# Patient Record
Sex: MALE | Race: OTHER | Employment: UNEMPLOYED | URBAN - METROPOLITAN AREA
[De-identification: names, ages, dates, MRNs, and addresses within clinical notes are randomized per-mention and may not be internally consistent; named-entity substitution may affect disease eponyms.]

---

## 2017-01-03 ENCOUNTER — OFFICE VISIT (OUTPATIENT)
Dept: FAMILY MEDICINE CLINIC | Age: 47
End: 2017-01-03

## 2017-01-03 ENCOUNTER — HOSPITAL ENCOUNTER (OUTPATIENT)
Dept: LAB | Age: 47
Discharge: HOME | End: 2017-01-03

## 2017-01-03 VITALS
TEMPERATURE: 98.3 F | SYSTOLIC BLOOD PRESSURE: 145 MMHG | HEART RATE: 101 BPM | WEIGHT: 154 LBS | DIASTOLIC BLOOD PRESSURE: 91 MMHG | BODY MASS INDEX: 25.66 KG/M2 | HEIGHT: 65 IN

## 2017-01-03 DIAGNOSIS — M25.512 LEFT SHOULDER PAIN, UNSPECIFIED CHRONICITY: ICD-10-CM

## 2017-01-03 DIAGNOSIS — M25.512 LEFT SHOULDER PAIN, UNSPECIFIED CHRONICITY: Primary | ICD-10-CM

## 2017-01-03 PROCEDURE — 83655 ASSAY OF LEAD: CPT | Performed by: FAMILY MEDICINE

## 2017-01-03 NOTE — PROGRESS NOTES
Madeline Ybarra seen at d/c, given AVS and reviewed today's visit with patient. Instructions on how to get walk-in x-ray given to pt, he expressed he had applied for the care card or knew about it. This has been fully explained to the patient, who indicates understanding.

## 2017-01-03 NOTE — PROGRESS NOTES
Coordination of Care  1. Have you been to the ER, urgent care clinic since your last visit?   Hospitalized since your last visit?no

## 2017-01-03 NOTE — PROGRESS NOTES
HISTORY OF PRESENT ILLNESS  Belia Samaniego is a 40 y.o. male. HPI Comments: Long list of c/o, the top of which is 5 months of left shoulder pain, mostly at night, that started after he was working out a lot. Reports no pain meds have helped, stopped working out. Some days aren't bad, other days are. Has noticed swelling of a joint on that side and also of the space above his clavicle. Worried about a lump he has had in the left calf since last spring when it started to hurt at the same time he began to have intermittent difficulty breathing. LBP for which he sees our chiropractor. Cyst left testicle that he thinks is growing. Shoulder Pain      Mass         ROS    Physical Exam   Constitutional: He appears well-developed and well-nourished. No distress. Neck: No thyromegaly present. Cardiovascular: Normal rate, regular rhythm and normal heart sounds. Pulmonary/Chest: Breath sounds normal.   No supraclavicular adenopathy. Genitourinary:   Genitourinary Comments: Tiny left testicular cyst, about 2mm in size. Musculoskeletal: He exhibits no edema. FROM of left shoulder without pain, no impingement. Hard superficial vein left midcalf that I can feel for about an inch distal, not tender. Lymphadenopathy:     He has no cervical adenopathy. ASSESSMENT and PLAN  Left shoulder pain, probably bursitis. He wants xray. Discussed cumulative effects of radiation related to xray, will check shoulder film and consider injection although exam was nl today. Incompetent vein left calf-- IJEOMA hose to the knee.   Probable small testicular cyst, with h/o U/s showing small hydroceles, no need for further eval.

## 2017-01-05 LAB
HISPANIC, LDP2T: NORMAL
LEAD BLD-MCNC: 1 UG/DL (ref 0–19)
RACE, 017371: NORMAL
SPECIMEN SOURCE: NORMAL
TEST PURPOSE, LDP4T: NORMAL

## 2017-01-10 ENCOUNTER — OFFICE VISIT (OUTPATIENT)
Dept: FAMILY MEDICINE CLINIC | Age: 47
End: 2017-01-10

## 2017-01-10 DIAGNOSIS — R52 PAIN: Primary | ICD-10-CM

## 2017-01-11 ENCOUNTER — HOSPITAL ENCOUNTER (OUTPATIENT)
Dept: GENERAL RADIOLOGY | Age: 47
Discharge: HOME | End: 2017-01-11
Payer: SUBSIDIZED

## 2017-01-11 DIAGNOSIS — M25.512 LEFT SHOULDER PAIN, UNSPECIFIED CHRONICITY: ICD-10-CM

## 2017-01-11 PROCEDURE — 71020 XR CHEST PA LAT: CPT

## 2017-05-24 ENCOUNTER — APPOINTMENT (OUTPATIENT)
Dept: GENERAL RADIOLOGY | Age: 47
End: 2017-05-24
Attending: EMERGENCY MEDICINE
Payer: SELF-PAY

## 2017-05-24 ENCOUNTER — HOSPITAL ENCOUNTER (EMERGENCY)
Age: 47
Discharge: HOME | End: 2017-05-24
Attending: EMERGENCY MEDICINE
Payer: SELF-PAY

## 2017-05-24 VITALS
HEIGHT: 67 IN | DIASTOLIC BLOOD PRESSURE: 81 MMHG | TEMPERATURE: 98.2 F | RESPIRATION RATE: 16 BRPM | OXYGEN SATURATION: 97 % | SYSTOLIC BLOOD PRESSURE: 140 MMHG | HEART RATE: 75 BPM | WEIGHT: 150.13 LBS | BODY MASS INDEX: 23.56 KG/M2

## 2017-05-24 DIAGNOSIS — M54.2 NECK PAIN: Primary | ICD-10-CM

## 2017-05-24 LAB
ANION GAP BLD CALC-SCNC: 6 MMOL/L (ref 5–15)
BASOPHILS # BLD AUTO: 0 K/UL (ref 0–0.1)
BASOPHILS # BLD: 0 % (ref 0–1)
BUN SERPL-MCNC: 7 MG/DL (ref 6–20)
BUN/CREAT SERPL: 8 (ref 12–20)
CALCIUM SERPL-MCNC: 8.5 MG/DL (ref 8.5–10.1)
CHLORIDE SERPL-SCNC: 103 MMOL/L (ref 97–108)
CO2 SERPL-SCNC: 31 MMOL/L (ref 21–32)
CREAT SERPL-MCNC: 0.85 MG/DL (ref 0.7–1.3)
EOSINOPHIL # BLD: 0.2 K/UL (ref 0–0.4)
EOSINOPHIL NFR BLD: 3 % (ref 0–7)
ERYTHROCYTE [DISTWIDTH] IN BLOOD BY AUTOMATED COUNT: 13.6 % (ref 11.5–14.5)
GLUCOSE SERPL-MCNC: 109 MG/DL (ref 65–100)
HCT VFR BLD AUTO: 44.2 % (ref 36.6–50.3)
HGB BLD-MCNC: 15.4 G/DL (ref 12.1–17)
LYMPHOCYTES # BLD AUTO: 22 % (ref 12–49)
LYMPHOCYTES # BLD: 1.5 K/UL (ref 0.8–3.5)
MCH RBC QN AUTO: 29.5 PG (ref 26–34)
MCHC RBC AUTO-ENTMCNC: 34.8 G/DL (ref 30–36.5)
MCV RBC AUTO: 84.7 FL (ref 80–99)
MONOCYTES # BLD: 0.4 K/UL (ref 0–1)
MONOCYTES NFR BLD AUTO: 6 % (ref 5–13)
NEUTS SEG # BLD: 4.6 K/UL (ref 1.8–8)
NEUTS SEG NFR BLD AUTO: 69 % (ref 32–75)
PLATELET # BLD AUTO: 274 K/UL (ref 150–400)
POTASSIUM SERPL-SCNC: 4.3 MMOL/L (ref 3.5–5.1)
RBC # BLD AUTO: 5.22 M/UL (ref 4.1–5.7)
SODIUM SERPL-SCNC: 140 MMOL/L (ref 136–145)
TROPONIN I SERPL-MCNC: <0.04 NG/ML
WBC # BLD AUTO: 6.7 K/UL (ref 4.1–11.1)

## 2017-05-24 PROCEDURE — 99283 EMERGENCY DEPT VISIT LOW MDM: CPT

## 2017-05-24 PROCEDURE — 70360 X-RAY EXAM OF NECK: CPT

## 2017-05-24 PROCEDURE — 85025 COMPLETE CBC W/AUTO DIFF WBC: CPT | Performed by: EMERGENCY MEDICINE

## 2017-05-24 PROCEDURE — 71020 XR CHEST PA LAT: CPT

## 2017-05-24 PROCEDURE — 93005 ELECTROCARDIOGRAM TRACING: CPT

## 2017-05-24 PROCEDURE — 80048 BASIC METABOLIC PNL TOTAL CA: CPT | Performed by: EMERGENCY MEDICINE

## 2017-05-24 PROCEDURE — 36415 COLL VENOUS BLD VENIPUNCTURE: CPT | Performed by: EMERGENCY MEDICINE

## 2017-05-24 PROCEDURE — 84484 ASSAY OF TROPONIN QUANT: CPT | Performed by: EMERGENCY MEDICINE

## 2017-05-24 RX ORDER — TRAMADOL HYDROCHLORIDE AND ACETAMINOPHEN 37.5; 325 MG/1; MG/1
1 TABLET ORAL
Qty: 20 TAB | Refills: 0 | Status: SHIPPED | OUTPATIENT
Start: 2017-05-24 | End: 2018-01-02

## 2017-05-24 NOTE — ED NOTES
Patient given discharge instructions by provider using language line. Patient verbalized understanding and denies questions.

## 2017-05-24 NOTE — ED PROVIDER NOTES
HPI Comments: 56 y.o. male with past medical history significant for CTS who presents to the ED with chief complaint of neck pain. Pt reports neck pain that \"feels like fullness\" onset about 5 days ago that is causing him to have difficulty breathing and swallowing. Pt states he has hx of similar sx once in the past about a year ago, says he was seen at [address] [place] [address] at that time, had a negative workup, and his sx resolved spontaneously. Pt's O2 sats are 100% on RA. Pt denies cough or fever. There are no other acute medical complaints voiced at this time. PCP: Joe Sawyer NP    Note written by Madison Ratliff, as dictated by Stefanie Reddy MD 1:31 PM     The history is provided by the patient. The history is limited by a language barrier (Dominican). A  was used ([place]). Past Medical History:   Diagnosis Date    Carpal tunnel syndrome        Past Surgical History:   Procedure Laterality Date    HX CARPAL TUNNEL RELEASE  2016    HX CYST REMOVAL Left 2016         Family History:   Problem Relation Age of Onset    Diabetes Neg Hx     Hypertension Neg Hx        Social History     Social History    Marital status: SINGLE     Spouse name: N/A    Number of children: N/A    Years of education: N/A     Occupational History    Not on file. Social History Main Topics    Smoking status: Former Smoker     Types: Cigarettes    Smokeless tobacco: Former User      Comment: 2011 quit    Alcohol use 0.0 oz/week     0 Standard drinks or equivalent per week      Comment: occ.  Drug use: No    Sexual activity: Yes     Partners: Female     Other Topics Concern    Not on file     Social History Narrative    ** Merged History Encounter **              ALLERGIES: Review of patient's allergies indicates no known allergies. Review of Systems   Constitutional: Negative. Negative for appetite change, fever and unexpected weight change. HENT: Positive for trouble swallowing. Negative for ear pain, hearing loss, nosebleeds, rhinorrhea and sore throat. Respiratory: Positive for shortness of breath. Negative for cough and chest tightness. Cardiovascular: Negative. Negative for chest pain and palpitations. Gastrointestinal: Negative. Negative for abdominal distention, abdominal pain, blood in stool and vomiting. Endocrine: Negative. Genitourinary: Negative for dysuria and hematuria. Musculoskeletal: Positive for neck pain. Negative for back pain and myalgias. Skin: Negative. Negative for rash. Allergic/Immunologic: Negative. Neurological: Negative. Negative for dizziness, syncope, weakness and numbness. Hematological: Negative. Psychiatric/Behavioral: Negative. All other systems reviewed and are negative. Vitals:    05/24/17 1307   BP: 138/87   Pulse: 90   Resp: 18   Temp: 98.2 °F (36.8 °C)   SpO2: 100%   Weight: 68.1 kg (150 lb 2.1 oz)   Height: 5' 7\" (1.702 m)            Physical Exam   Constitutional: He is oriented to person, place, and time. He appears well-developed and well-nourished. No distress. HENT:   Head: Normocephalic and atraumatic. Right Ear: External ear normal.   Left Ear: External ear normal.   Nose: Nose normal.   Mouth/Throat: Oropharynx is clear and moist.   Eyes: Conjunctivae and EOM are normal. Pupils are equal, round, and reactive to light. Neck: Normal range of motion. Neck supple. No JVD present. No thyromegaly present. Cardiovascular: Normal rate, regular rhythm, normal heart sounds and intact distal pulses. No murmur heard. Pulmonary/Chest: Effort normal and breath sounds normal. No respiratory distress. He has no wheezes. He has no rales. Abdominal: Soft. Bowel sounds are normal. He exhibits no distension. There is no tenderness. Musculoskeletal: Normal range of motion. He exhibits no edema. Neurological: He is alert and oriented to person, place, and time. No cranial nerve deficit.    Skin: Skin is warm and dry. No rash noted. Psychiatric: He has a normal mood and affect. His behavior is normal. Thought content normal.   Nursing note and vitals reviewed. Note written by Madison Rodarte, as dictated by John Baugh MD 1:32 PM    [facility]  ED Course       Procedures     ED EKG interpretation:  Rhythm: normal sinus rhythm; and regular . Rate (approx.): 90; Axis: normal; ST/T wave: normal; no ectopy. Note written by Madison Rodarte, as dictated by John Baugh MD 1:18 PM    PROGRESS NOTE:  2:37 PM   BMP and CBC normal. Troponin negative. X-ray of neck showed no soft tissue swelling. Chest x-ray normal.     A/P: Nonspecific neck pain. Will treat with short term analgesics and f/u with PCP as needed.

## 2017-05-24 NOTE — DISCHARGE INSTRUCTIONS
We hope that we have addressed all of your medical concerns. The examination and treatment you received in the Emergency Department were for an emergent problem and were not intended as complete care. It is important that you follow up with your healthcare provider(s) for ongoing care. If your symptoms worsen or do not improve as expected, and you are unable to reach your usual health care provider(s), you should return to the Emergency Department. Today's healthcare is undergoing tremendous change, and patient satisfaction surveys are one of the many tools to assess the quality of medical care. You may receive a survey from the [employer] regarding your experience in the Emergency Department. I hope that your experience has been completely positive, particularly the medical care that I provided. As such, please participate in the survey; anything less than excellent does not meet my expectations or intentions. [facility] and [employer] participate in nationally recognized quality of care measures. If your blood pressure is greater than 120/80, as reported below, we urge that you seek medical care to address the potential of high blood pressure, commonly known as hypertension. Hypertension can be hereditary or can be caused by certain medical conditions, pain, stress, or \"white coat syndrome. \"       Please make an appointment with your health care provider(s) for follow up of your Emergency Department visit. VITALS:   Patient Vitals for the past 8 hrs:   Temp Pulse Resp BP SpO2   05/24/17 1307 98.2 °F (36.8 °C) 90 18 138/87 100 %          Thank you for allowing us to provide you with medical care today. We realize that you have many choices for your emergency care needs. Please choose us in the future for any continued health care needs. Regards,           Cesar Higgins, [address] [place] [address] [address]   Office: [phone]            Recent Results (from the past 24 hour(s))   METABOLIC PANEL, BASIC    Collection Time: 05/24/17  1:51 PM   Result Value Ref Range    Sodium 140 136 - 145 mmol/L    Potassium 4.3 3.5 - 5.1 mmol/L    Chloride 103 97 - 108 mmol/L    CO2 31 21 - 32 mmol/L    Anion gap 6 5 - 15 mmol/L    Glucose 109 (H) 65 - 100 mg/dL    BUN 7 6 - 20 MG/DL    Creatinine 0.85 0.70 - 1.30 MG/DL    BUN/Creatinine ratio 8 (L) 12 - 20      GFR est AA >60 >60 ml/min/1.73m2    GFR est non-AA >60 >60 ml/min/1.73m2    Calcium 8.5 8.5 - 10.1 MG/DL   CBC WITH AUTOMATED DIFF    Collection Time: 05/24/17  1:51 PM   Result Value Ref Range    WBC 6.7 4.1 - 11.1 K/uL    RBC 5.22 4.10 - 5.70 M/uL    HGB 15.4 12.1 - 17.0 g/dL    HCT 44.2 36.6 - 50.3 %    MCV 84.7 80.0 - 99.0 FL    MCH 29.5 26.0 - 34.0 PG    MCHC 34.8 30.0 - 36.5 g/dL    RDW 13.6 11.5 - 14.5 %    PLATELET 461 184 - 949 K/uL    NEUTROPHILS 69 32 - 75 %    LYMPHOCYTES 22 12 - 49 %    MONOCYTES 6 5 - 13 %    EOSINOPHILS 3 0 - 7 %    BASOPHILS 0 0 - 1 %    ABS. NEUTROPHILS 4.6 1.8 - 8.0 K/UL    ABS. LYMPHOCYTES 1.5 0.8 - 3.5 K/UL    ABS. MONOCYTES 0.4 0.0 - 1.0 K/UL    ABS. EOSINOPHILS 0.2 0.0 - 0.4 K/UL    ABS. BASOPHILS 0.0 0.0 - 0.1 K/UL   TROPONIN I    Collection Time: 05/24/17  1:51 PM   Result Value Ref Range    Troponin-I, Qt. <0.04 <0.05 ng/mL       Xr Neck Soft Tissue    Result Date: 5/24/2017  Clinical history: Neck pain INDICATION: Neck swelling FINDINGS: Lateral and AP views of the cervical soft tissues were obtained. There is no fracture or dislocation. Visualized osseous structures are within normal limits. Postprocedural soft tissue within normal limits. No radiopaque foreign body. IMPRESSION: No acute findings     Xr Chest Pa Lat    Result Date: 5/24/2017  CLINICAL HISTORY: Dyspnea INDICATION: Dyspnea COMPARISON: 1117 FINDINGS: PA and lateral views of the chest are obtained. The cardiopericardial silhouette is within normal limits.  There is no pleural effusion, pneumothorax or focal consolidation present. IMPRESSION: No acute intrathoracic disease.

## 2017-05-25 ENCOUNTER — PATIENT OUTREACH (OUTPATIENT)
Dept: FAMILY MEDICINE CLINIC | Age: 47
End: 2017-05-25

## 2017-05-25 LAB
ATRIAL RATE: 90 BPM
CALCULATED P AXIS, ECG09: 60 DEGREES
CALCULATED R AXIS, ECG10: 56 DEGREES
CALCULATED T AXIS, ECG11: 62 DEGREES
DIAGNOSIS, 93000: NORMAL
P-R INTERVAL, ECG05: 130 MS
Q-T INTERVAL, ECG07: 342 MS
QRS DURATION, ECG06: 74 MS
QTC CALCULATION (BEZET), ECG08: 418 MS
VENTRICULAR RATE, ECG03: 90 BPM

## 2018-01-02 ENCOUNTER — OFFICE VISIT (OUTPATIENT)
Dept: FAMILY MEDICINE CLINIC | Age: 48
End: 2018-01-02

## 2018-01-02 VITALS
TEMPERATURE: 98.8 F | HEART RATE: 81 BPM | WEIGHT: 157 LBS | DIASTOLIC BLOOD PRESSURE: 85 MMHG | SYSTOLIC BLOOD PRESSURE: 131 MMHG | BODY MASS INDEX: 24.59 KG/M2

## 2018-01-02 DIAGNOSIS — F41.9 ANXIETY: ICD-10-CM

## 2018-01-02 DIAGNOSIS — E73.9 LACTOSE INTOLERANCE IN ADULT: ICD-10-CM

## 2018-01-02 DIAGNOSIS — K58.2 IRRITABLE BOWEL SYNDROME WITH BOTH CONSTIPATION AND DIARRHEA: Primary | ICD-10-CM

## 2018-01-02 RX ORDER — DICYCLOMINE HYDROCHLORIDE 10 MG/1
20 CAPSULE ORAL 3 TIMES DAILY
Qty: 90 CAP | Refills: 1 | Status: SHIPPED | OUTPATIENT
Start: 2018-01-02

## 2018-01-02 RX ORDER — DICYCLOMINE HYDROCHLORIDE 20 MG/1
20 TABLET ORAL EVERY 6 HOURS
Qty: 30 TAB | Refills: 1 | Status: SHIPPED | OUTPATIENT
Start: 2018-01-02 | End: 2018-01-02

## 2018-01-02 NOTE — PROGRESS NOTES
Assessment/Plan:       ICD-10-CM ICD-9-CM    1. Irritable bowel syndrome with both constipation and diarrhea K58.2 564.1 dicyclomine (BENTYL) 10 mg capsule      DISCONTINUED: dicyclomine (BENTYL) 20 mg tablet   2. Lactose intolerance in adult E73.9 271.3    3. Anxiety F41.9 300.00        [address], [address] [place] [address] Tod Herrera  [address] [facility] [address]  Phone: [phone] Fax: [phone]      [facility]-  [address]  Subjective:   No chief complaint on file. Snow Ybarra is a 56 y.o. OTHER male. HPI: was at [facility]note 3[facility] for neck swelling and SOB about 6 months ago. He  has a past medical history of Carpal tunnel syndrome. He  does not have a problem list on file. Review of Systems: Negative for: fever, chest pain, shortness of breath, leg swelling, exertional dyspnea, palpitations. Current Medications:   No current outpatient prescriptions on file prior to visit. No current facility-administered medications on file prior to visit. Past Surgical History: He  has a past surgical history that includes hx carpal tunnel release (2016) and hx cyst removal (Left, 2016). Social and Family History: He  reports that he has quit smoking. His smoking use included Cigarettes. He has quit using smokeless tobacco. He reports that he does not drink alcohol or use illicit drugs. ; family history is negative for Diabetes and Hypertension. Objective:     Vitals:    01/02/18 0914   BP: 131/85   Pulse: 81   Temp: 98.8 °F (37.1 °C)   TempSrc: Oral   Weight: 157 lb (71.2 kg)    No LMP for male patient. Wt Readings from Last 2 Encounters:   01/02/18 157 lb (71.2 kg)   05/24/17 150 lb 2.1 oz (68.1 kg)     No results found for any visits on 01/02/18. Physical Examination:  General appearance - well developed, no acute distress. Chest - clear to auscultation. Heart - regular rate and rhythm without murmurs, rubs, or gallops.   Abdomen - bowel sounds present x 4, NT, ND. Extremities - pulses intact. No peripheral edema. Assessment/Plan:   Diagnoses and all orders for this visit:    1. Irritable bowel syndrome with both constipation and diarrhea  -     dicyclomine (BENTYL) 10 mg capsule; Take 2 Caps by mouth three (3) times daily. Upper sorbian sig    2. Lactose intolerance in adult    3. Anxiety    Follow-up Disposition:  Return for to see Tanya Cheema for anxiety. Adwoa Rodriguez, LEXX, FNP-BC, BC-ADM  Karla Ybarra expressed understanding of this plan.

## 2018-01-02 NOTE — PROGRESS NOTES
Coordination of Care  1. Have you been to the ER, urgent care clinic since your last visit? Hospitalized since your last visit? Yes When: 6 months ago Where: [facility] Reason for visit: neck swelling and s. o.b    2. Have you seen or consulted any other health care providers outside of the [facility] since your last visit? Include any pap smears or colon screening. No    Medications  Does the patient need refills? NO    Learning Assessment Complete?  yes

## 2018-01-02 NOTE — Clinical Note
Has been to ER multiple times. We discussed how stress manifests as pain in the body. He is accepting of his previous diagnosis of anxiety. I discussed IBS as being along the lines of an anxious stomach; he is also lactose intolerant. I believe he will be receptive to relaxation techniques and other methods of reducing the physical stress in his body. I hope that I have reassurred him that there is nothing life threatening going on with his lungs or stomach and that at this time he does not have a weak immune system (he was told he did 18 months ago).

## 2018-01-02 NOTE — PATIENT INSTRUCTIONS
Intolerancia a la lactosa: Instrucciones de cuidado - [ Lactose Intolerance: Care Instructions ]  Instrucciones de cuidado    La lactosa es el azúcar que se encuentra en la Humptulips y los productos lácteos. Algunas personas no producen la cantidad suficiente de ryan enzima llamada lactasa, que digiere la Ulrichen. Cuando esto ocurre puede causar gases, dolor abdominal, diarrea y abotagamiento. Esta afección recibe el nombre de intolerancia a la lactosa. [place] no es lo mismo que ryan alergia alimentaria a la Humptulips. La intolerancia a la lactosa afecta a cada persona de manera distinta. Algunas personas no pueden digerir ningún producto lácteo. Otras personas pueden comer o beber pequeñas cantidades o determinados tipos de productos lácteos sin problemas. Usted puede aprender a evitar la incomodidad sin dejar de recibir suficiente calcio para mantener los [employer]. La atención de seguimiento es ryan parte clave de mcguire tratamiento y seguridad. Asegúrese de hacer y acudir a todas las citas, y llame a mcguire médico si está teniendo problemas. También es ryan buena idea saber los resultados de los exámenes y mantener ryan lista de los medicamentos que minerva. ¿Cómo puede cuidarse en el hogar? · Limite la cantidad de Humptulips y productos lácteos en mcguire Erminia December. Trate de beber 1 vaso de [employer]ocker. Dodie cantidades pequeñas varias veces al día. Todos los tipos de Humptulips tienen la misma cantidad de Ulrichen. En nima de no estar seguro de si un producto lácteo le causa síntomas, pruebe con ryan pequeña cantidad y espere a javid cómo se siente antes de comer o beber ryan cantidad mayor. · Jeaneen Janiya o coma productos lácteos con otros alimentos. Para algunas personas, combinar un alimento sólido (jayne cereales) con un lácteo (jayne la Humptulips) puede reducir los síntomas. · Consuma pequeñas cantidades de productos lácteos troy el día en lugar de grandes cantidades de ryan mariana vez.   · Jeaneen Janiya o coma productos lácteos con bajo contenido de Ulrichen. Puede comprar estos productos, jayne la [employer], en la mayoría de los supermercados. · Dodie o coma otros alimentos en lugar de Bejou y productos lácteos. Pruebe la Bejou y el queso de soya, y utilice cremas no lácteas para el café. Tenga en cuenta que las cremas no lácteas pueden contener más grasa que la Bejou. · Use productos de lactasa. Se trata de suplementos dietéticos que le ayudan a digerir la lactosa. Algunos son pastillas para masticar (jayne Lactaid) antes de comer o beber productos lácteos. Otros son líquidos que se añaden a la leche 24 horas antes de beberla. Pruebe algunos productos y [place] el que sea más eficaz para usted. · Algunas personas que tienen intolerancia a la lactosa pueden comer algunos tipos de yogur sin problemas, sobre todo si tiene cultivos vivos. Es mejor probar ryan pequeña cantidad de [employer]ot de yogur para javid cuáles le funcionan mejor. · Preste atención al contenido de lactosa en los alimentos que compra. Algunos alimentos preparados contienen lactosa, jayne los panes y los productos de panadería, los cereales para el desayuno, las doug y sopas instantáneas, la [place], los aderezos para ensalada y muchos refrigerios. Asegúrese de leer las etiquetas para determinar si hay lactosa o nombres \"ocultos\" de [place]. [employer] se encuentran los extractos secos de la Bejou, el kristen, la La junta, los subproductos de la Bejou y la leche descremada en polvo. · Asegúrese de obtener suficiente calcio en la dieta, sobre todo si suprime por completo los productos lácteos. Para obtener la cantidad suficiente, necesita comer alimentos ricos en calcio con la misma frecuencia con la que ryan persona [place]. El calcio es muy importante porque mantiene los huesos nathanael y reduce el riesgo de osteoporosis. [place] consejo a mcguire dietista sobre cómo obtener suficiente calcio.  Entre los alimentos que contienen calcio se encuentran:  ¨ El brócoli, la espinaca, la col rizada, la berza, y las hojas de [place] y de [facility]. ¨ Las frantz enlatadas y otros pescados pequeños que pueda comer con [place]. ¨ Jugo de naranja enriquecido con calcio. ¨ Los productos de soya, jayne la [place] de soya y el tofu enriquecidos. ¨ Almendras. ¨ Frijoles (habichuelas) secos. · Si está preocupado por no consumir suficientes nutrientes, pregúntele a mcguire médico si debe gosia suplementos, jayne de calcio y vitamina D.  ¿Cuándo debe pedir ayuda? Llame a mcguire médico ahora mismo o busque atención médica inmediata si:  ? · Tiene dolor abdominal nuevo o peor. ?Preste especial atención a los cambios en mcguire thalia y asegúrese de comunicarse con mcguire médico si:  ? · No mejora jayne se esperaba. ¿Dónde puede encontrar más información en inglés? Afsaneh Mesa a http://sybil-dionicio.info/. Escriba H201 en la búsqueda para aprender más acerca de \"Intolerancia a la lactosa: Instrucciones de cuidado - [ Lactose Intolerance: Care Instructions ]. \"  Revisado: 29 septiembre, 2016  Versión del contenido: 11.4  © 0694-6240 Healthwise, Incorporated. Las instrucciones de cuidado fueron adaptadas bajo licencia por [facility] (which disclaims liability or warranty for this information). Si usted tiene [place] afección médica o sobre estas instrucciones, siempre pregunte a mcguire profesional de thalia. Healthwise, Incorporated niega toda garantía o responsabilidad por mcguire uso de esta información. Dieta restringida en lactosa: Instrucciones de cuidado - [ Lactose-Restricted Diet: Care Instructions ]  Instrucciones de cuidado    La lactosa es el azúcar presente en la [place] y los productos lácteos. Algunas personas no producen la cantidad suficiente de ryan enzima llamada lactasa, que digiere la Ulrichen. Cuando esto ocurre puede causar gases, dolor abdominal, diarrea y abotagamiento (sensación de estar inflado).  Esta afección recibe el nombre de intolerancia a la lactosa. [place] no es lo mismo que ryan alergia alimentaria a la AT&T. Si lo planea, puede evitar la lactosa y continuar comiendo ryan [place] y nutritiva, que también le permita obtener el calcio necesario para mantener los huesos saludables. Mcguire médico y Maryse[employer] ayudarán a crear ryan dieta basada en mcguire nivel de intolerancia a la lactosa y lo que le gusta comer. Siempre consulte a mcguire médico o dietista antes de hacer modificaciones en mcguire dieta. La atención de seguimiento es ryan parte clave de mcguire tratamiento y seguridad. Asegúrese de hacer y acudir a todas las citas, y llame a mcguire médico si está teniendo problemas. También es ryan buena idea saber los resultados de los exámenes y mantener ryan lista de los medicamentos que minerva. ¿Cómo puede cuidarse en el hogar? · Limite la cantidad de AT&T y productos lácteos en mcguire Erminia December. [address] productos lácteos en pequeñas cantidades a lo lorin del día, en lugar de consumir grandes cantidades a la vez. ¨ Si tiene síntomas graves cuando come o lesly algo con lactosa, es posible que necesite evitarla por completo. ¨ Quizá pueda beber 1 vaso de leche al día, aunque es posible que no pueda beber más de ½ taza a la vez. Todos los tipos de AT&T tienen la misma cantidad de Ulrichen. ¨ En nima de no estar seguro de si un producto lácteo le causa síntomas, pruebe con ryan pequeña cantidad y espere a javid cómo se siente antes de comer o beber ryan cantidad mayor. · Intente comer yogur y Jewell-barre. Tienen menos [address] y es posible que no le causen [place]. · [employer] Corporation o coma productos lácteos con bajo contenido de Ulrichen. Puede comprar estos productos, jayne la [employer], en la mayoría de los supermercados. · Use productos de lactasa. Estos son suplementos dietéticos que le ayudan a digerir la lactosa. Algunos vienen en pastillas para masticar (jayne Lactaid) antes de comer o beber productos lácteos.  Otros son líquidos que se le añaden a [place] 24 horas antes de tomarla. Pruebe algunos productos y [place] el que sea más eficaz para usted. · Dodie o coma otros alimentos, jayne leche de soya y queso de soya, en lugar de [place] y productos lácteos. · Si es muy sensible a la lactosa, alissa las etiquetas detenidamente para identificar los productos que contengan Ulrichen. ¨ Algunos medicamentos contienen lactosa. ¨ Los alimentos preparados que pueden contener lactosa incluyen el pan, los productos horneados, los cereales para el desayuno, las bebidas instantáneas para el desayuno, las doug y sopas instantáneas, los preparados para hornear (jayne panqueques, galletas y bizcochos), la [place], los aderezos para [place], los [place], el chocolate con [place] y otros refrigerios. ¨ Otros nombres alternativos que se usan para la lactosa son kristen de Aliyah Ax productos lácteos. · Asegúrese de obtener suficiente calcio en la dieta, sobre todo si suprime por completo los productos lácteos. Para obtener la cantidad suficiente, necesita comer alimentos ricos en calcio con la misma frecuencia con la que ryan persona Eulas Burdock. El calcio es muy importante porque mantiene los huesos nathanael y reduce el riesgo de osteoporosis. [place] consejo a mcguire dietista sobre cómo obtener suficiente calcio. Entre los alimentos que contienen calcio se encuentran:  ¨ El brócoli, la espinaca, la col rizada, la [place], y las hojas de [place] y de nabo. ¨ Las frantz enlatadas y otros pescados pequeños que pueda comer con [place]. ¨ El jugo de naranja enriquecido con calcio. ¨ Los productos de soya, jayne la [place] de soya y el tofu enriquecidos. ¨ Almendras. ¨ Frijoles (habichuelas) secos. · Si está preocupado por no consumir suficientes nutrientes, pregúntele a mcguire médico si debe gosia suplementos, jayne de calcio y vitamina D.  ¿Cuándo debe pedir ayuda? Llame a mcguire médico ahora mismo o busque atención médica inmediata si:  ? · Tiene dolor abdominal nuevo o peor. ?Preste especial atención a los cambios en mcguire thalia y asegúrese de comunicarse con mcguire médico si:  ? · No mejora jayne se esperaba. ¿Dónde puede encontrar más información en inglés? Bro Martinez a http://sybil-dionicio.info/. Mohini Nelson F952 en la búsqueda para aprender más acerca de \"Dieta restringida en lactosa: Instrucciones de cuidado - [ Lactose-Restricted Diet: Care Instructions ]. \"  Revisado: 29 septiembre, 2016  Versión del contenido: 11.4  © 2736-8067 Healthwise, Incorporated. Las instrucciones de cuidado fueron adaptadas bajo licencia por [facility] [employer] (which disclaims liability or warranty for this information). Si usted tiene [place] afección médica o sobre estas instrucciones, siempre pregunte a mcguire profesional de thalia. Healthwise, Incorporated niega toda garantía o responsabilidad por mcguire uso de esta información.

## 2018-01-02 NOTE — PROGRESS NOTES
Assessment/Plan:       ICD-10-CM ICD-9-CM    1. Irritable bowel syndrome with both constipation and diarrhea K58.2 564.1 dicyclomine (BENTYL) 10 mg capsule      DISCONTINUED: dicyclomine (BENTYL) 20 mg tablet   2. Lactose intolerance in adult E73.9 271.3    3. Anxiety F41.9 300.00        [facility] [address] [place] [address] Tod Herrera  [address] [facility] [id number]  Phone: [phone] Fax: [phone]      [facility]-  [address]  Subjective:   No chief complaint on file. Yuli Ybarra is a 56 y.o. OTHER male. HPI: was at [address] [place] [address] for neck swelling and SOB about 6 months ago. Pain LLQ for 15 days - this is new and affects his breathing. He has noticed a change in his bowels. Feels like the lungs are weak. He is sleeping well. No nausea. No blood in the stool. No changes in the urine. For 2 years he has had constipation. He has been diagnosed with anxiety, given pills but when they finished he stopped taking them. He  has a past medical history of Carpal tunnel syndrome. He  does not have a problem list on file. Review of Systems: Negative for: fever, chest pain, shortness of breath, leg swelling, exertional dyspnea, palpitations. Current Medications:   No current outpatient prescriptions on file prior to visit. No current facility-administered medications on file prior to visit. Past Surgical History: He  has a past surgical history that includes hx carpal tunnel release (2016) and hx cyst removal (Left, 2016). Social and Family History: He  reports that he has quit smoking. His smoking use included Cigarettes. He has quit using smokeless tobacco. He reports that he does not drink alcohol or use illicit drugs. ; family history is negative for Diabetes and Hypertension. Objective:     Vitals:    01/02/18 0914   BP: 131/85   Pulse: 81   Temp: 98.8 °F (37.1 °C)   TempSrc: Oral   Weight: 157 lb (71.2 kg)    No LMP for male patient. Wt Readings from Last 2 Encounters:   01/02/18 157 lb (71.2 kg)   05/24/17 150 lb 2.1 oz (68.1 kg)     No results found for any visits on 01/02/18. Physical Examination:  General appearance - well developed, no acute distress. Chest - clear to auscultation. Heart - regular rate and rhythm without murmurs, rubs, or gallops. Abdomen - bowel sounds present x 4, mild tenderness LLQ, ND  Extremities - pulses intact. No peripheral edema. Assessment/Plan:   Diagnoses and all orders for this visit:    1. Irritable bowel syndrome with both constipation and diarrhea  -     dicyclomine (BENTYL) 10 mg capsule; Take 2 Caps by mouth three (3) times daily. Korean sig    2. Lactose intolerance in adult    3. Anxiety      Follow-up Disposition:  Return for to see Salome Adjutant for anxiety. He has panic attacks. To see Salome Adjutant. Taking \"Fortiflex\" for 10 days. (this is glucosamine chondroitin and green tea extract; the green tea might possibly contribute to increased anxiety due to caffeine). Pravin Neri, LEXX, FNP-BC, BC-ADM  Misael Ybarra expressed understanding of this plan.

## 2018-02-06 ENCOUNTER — OFFICE VISIT (OUTPATIENT)
Dept: FAMILY MEDICINE CLINIC | Age: 48
End: 2018-02-06

## 2018-02-06 DIAGNOSIS — F41.1 GENERALIZED ANXIETY DISORDER: Primary | ICD-10-CM

## 2018-02-06 NOTE — PROGRESS NOTES
INITIAL SESSION    Referred by Mary Ferrer for anxiety related physical symptoms. Reports that anxiety started about 2 years ago when he had a breathing event at [employer]. It turned out to be low potassium, but he feels he has been anxious since then. No anxiety or depression in his family. Lives with girlfriend and her 2 children who are in college, so not there a lot. Denies any high stress at home, other than typical financial concerns. Has friends, but often does not feel like going out with them. Only works 4 days/week because 'that's all I can handle.' Works as a  and is frequently worrying on his way into work that he will not be able to handle the work for that day. Has never had this happen, though. 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 Has fears of serious medical issues with himself, but does not worry about this in others in his family. Sleeps and eats ok. Is very aware of what he eats as he struggles with problems with colon. Farooq also reported low self esteem (related to not being able to work) and being too sensitive to what others might say to him or around him; tends to take things personally that are not directed to him. Farooq uses chamomile tea, breathing, self talk, distractions, and talking with family in [place], to calm himself when he becomes anxious. He does not take medication bc he does not want to become too reliant on them. We discussed progressive relaxation and I gave him handouts on anger, anxiety, progressive relaxation, and breathing. He demonstrated interest and we will follow up on any practices he has been doing next session.

## 2018-03-13 ENCOUNTER — OFFICE VISIT (OUTPATIENT)
Dept: FAMILY MEDICINE CLINIC | Age: 48
End: 2018-03-13

## 2018-03-13 DIAGNOSIS — F41.1 GENERALIZED ANXIETY DISORDER: Primary | ICD-10-CM

## 2018-06-24 ENCOUNTER — APPOINTMENT (OUTPATIENT)
Dept: GENERAL RADIOLOGY | Age: 48
End: 2018-06-24
Attending: NURSE PRACTITIONER
Payer: SELF-PAY

## 2018-06-24 ENCOUNTER — HOSPITAL ENCOUNTER (EMERGENCY)
Age: 48
Discharge: HOME | End: 2018-06-24
Attending: EMERGENCY MEDICINE
Payer: SELF-PAY

## 2018-06-24 VITALS
RESPIRATION RATE: 12 BRPM | SYSTOLIC BLOOD PRESSURE: 110 MMHG | HEART RATE: 98 BPM | WEIGHT: 165 LBS | HEIGHT: 65 IN | TEMPERATURE: 98.5 F | OXYGEN SATURATION: 97 % | BODY MASS INDEX: 27.49 KG/M2 | DIASTOLIC BLOOD PRESSURE: 72 MMHG

## 2018-06-24 DIAGNOSIS — M43.6 TORTICOLLIS: Primary | ICD-10-CM

## 2018-06-24 PROCEDURE — 99281 EMR DPT VST MAYX REQ PHY/QHP: CPT

## 2018-06-24 PROCEDURE — 73030 X-RAY EXAM OF SHOULDER: CPT

## 2018-06-24 PROCEDURE — 96372 THER/PROPH/DIAG INJ SC/IM: CPT

## 2018-06-24 PROCEDURE — 74011250636 HC RX REV CODE- 250/636: Performed by: NURSE PRACTITIONER

## 2018-06-24 RX ORDER — CYCLOBENZAPRINE HCL 5 MG
5 TABLET ORAL
Qty: 20 TAB | Refills: 0 | OUTPATIENT
Start: 2018-06-24 | End: 2020-03-03

## 2018-06-24 RX ORDER — KETOROLAC TROMETHAMINE 30 MG/ML
60 INJECTION, SOLUTION INTRAMUSCULAR; INTRAVENOUS
Status: COMPLETED | OUTPATIENT
Start: 2018-06-24 | End: 2018-06-24

## 2018-06-24 RX ORDER — NAPROXEN 500 MG/1
500 TABLET ORAL
Qty: 20 TAB | Refills: 0 | OUTPATIENT
Start: 2018-06-24 | End: 2020-03-03

## 2018-06-24 RX ADMIN — KETOROLAC TROMETHAMINE 60 MG: 30 INJECTION, SOLUTION INTRAMUSCULAR at 13:42

## 2018-06-24 NOTE — ED PROVIDER NOTES
HPI Comments: 53 y.o. male with no significant past medical history who presents from home with chief complaint of left shoulder pain. Pt reports he woke up this more with mild to moderate pain in his left should that radiated to his neck. Pt reports the pain is constant and achy but sharp with range of motion of the joint. Pt states he has not taken any medications for the pain. Of note, pt reports he has chronic left shoulder pain but today's pain is significantly more severe. Pt denies any injury or trauma. Pt denies fever, chills, cough, chest pain, shortness of breath, abdominal pain, nausea, vomiting, diarrhea, difficulty with urination or dysuria. There are no other acute medical concerns at this time. Social hx - Tobacco use: former smoker, Alcohol Use: occasional    PCP: Cecile Loja NP    Note written by Madison Terrell, as dictated by MURALI Burden 1:39 PM.        The history is provided by the patient. A  was used. Past Medical History:   Diagnosis Date    Carpal tunnel syndrome        Past Surgical History:   Procedure Laterality Date    HX CARPAL TUNNEL RELEASE  2016    HX CYST REMOVAL Left 2016         Family History:   Problem Relation Age of Onset    Diabetes Neg Hx     Hypertension Neg Hx        Social History     Social History    Marital status: SINGLE     Spouse name: N/A    Number of children: N/A    Years of education: N/A     Occupational History    Not on file. Social History Main Topics    Smoking status: Former Smoker     Types: Cigarettes    Smokeless tobacco: Former User      Comment: 2011 quit    Alcohol use No      Comment: occ.  Drug use: No    Sexual activity: Yes     Partners: Female     Other Topics Concern    Not on file     Social History Narrative    ** Merged History Encounter **              ALLERGIES: Review of patient's allergies indicates no known allergies.     Review of Systems   Constitutional: Negative for chills and fever. HENT: Negative for ear pain and sore throat. Eyes: Negative for pain. Respiratory: Negative for chest tightness and shortness of breath. Cardiovascular: Negative for chest pain and leg swelling. Gastrointestinal: Negative for abdominal pain, nausea and vomiting. Genitourinary: Negative for dysuria and flank pain. Musculoskeletal: Positive for arthralgias. Negative for back pain. Skin: Negative for rash. Neurological: Negative for headaches. All other systems reviewed and are negative. Vitals:    06/24/18 1307   BP: 110/72   Pulse: 98   Resp: 12   Temp: 98.5 °F (36.9 °C)   SpO2: 97%   Weight: 74.8 kg (165 lb)   Height: 5' 5.35\" (1.66 m)            Physical Exam   Constitutional: No distress. HENT:   Head: Normocephalic and atraumatic. Eyes: No scleral icterus. Neck: No tracheal deviation present. Cardiovascular: Normal rate and regular rhythm. Pulmonary/Chest: Effort normal. No respiratory distress. Abdominal: He exhibits no distension. Musculoskeletal: He exhibits tenderness. He exhibits no deformity. Pt is tender over the left clavicle area. Has paraspinal tenderness over the left neck. Good  strength. Decreased range of motion with flexion on the left. Neurological: He is alert. Skin: Skin is warm and dry. Psychiatric: He has a normal mood and affect. Nursing note and vitals reviewed.      Note written by Madison Lamar, as dictated by  MURALI Mary 1:39 PM.    MDM  Number of Diagnoses or Management Options  Torticollis: new and requires workup     Amount and/or Complexity of Data Reviewed  Tests in the radiology section of CPT®: ordered  Discuss the patient with other providers: yes ([place])    Risk of Complications, Morbidity, and/or Mortality  Presenting problems: low  Diagnostic procedures: low  Management options: minimal    Patient Progress  Patient progress: improved        ED Course Procedures      LABORATORY TESTS:  No results found for this or any previous visit (from the past 12 hour(s)). IMAGING RESULTS:    MEDICATIONS GIVEN:  Medications   ketorolac (TORADOL) injection 60 mg (60 mg IntraMUSCular Given 6/24/18 1342)       IMPRESSION:  1. Torticollis        PLAN:  1. Naproxen and Flexeril  2. F/U with PCP  Return to ED if worse    Discharge Note  2:27 PM  The patient is ready for discharge. The patient's signs, symptoms, diagnosis, and discharge instructions have been discussed and the patient has conveyed their understanding. The patient is to follow up as recommended or return to the ER should their symptoms worsen. Plan has been discussed and the patient is in agreement. Jeannine Bright Pagé FNP-BC.

## 2018-06-24 NOTE — ED TRIAGE NOTES
Using Yoruba interpretor: \"I am having a pain in my clavicle from my neck to my shoulder\" Indicates pain is in the left side. Denies any injury. Reports that the pain started in left shoulder for 6 months or more but today got really bad.

## 2018-06-24 NOTE — DISCHARGE INSTRUCTIONS
Tortícolis: Instrucciones de cuidado - [ Torticollis: Care Instructions ]  Instrucciones de cuidado  La tortícolis es ryan rigidez significativa de los músculos en un lado del gopi. Los músculos tensos pueden hacer que la dave gire hacia un lado, se tuerza hacia un lado o se incline [place] adelante o hacia atrás. Mcguire médico le preguntó sobre mcguire thalia y lo examinó. Es posible que [employer] haya hecho radiografías u otras pruebas. Si mcguire médico piensa que otro problema médico es la causa de mcguire tensión en los músculos del gopi, es posible que necesite [place]. La tortícolis generalmente mejora con el cuidado en el [facility]. Mcguire médico puede hacer que tome medicamentos para aliviar el dolor o [employer]. Podría sugerir ejercicio y fisioterapia para ayudar a aumentar la flexibilidad y aliviar el estrés. Mcguire médico también podría hacerle usar un dispositivo especial, llamado collarín cervical, por un [address] El collarín puede ayudar a tener el gopi más cómodo. La atención de seguimiento es ryan parte clave de mcguire tratamiento y seguridad. Asegúrese de hacer y acudir a todas las citas, y llame a mcguire médico si está teniendo problemas. También es ryan buena idea saber los resultados de los exámenes y mantener ryan lista de los medicamentos que minerva. ¿Cómo puede cuidarse en el [facility]? · Sea kevin con los medicamentos. Yenifer y siga todas las instrucciones de la [place]. ¨ Si el médico le recetó un analgésico (medicamento para el dolor), tómelo según las indicaciones. ¨ Si no está tomando un analgésico recetado, pregúntele a mcguire médico si puede gosia medardo de [employer]. · Pruebe a utilizar ryan almohadilla térmica a baja o media temperatura por entre 15 y 20 minutos cada 2 o 3 horas. Pruebe con [place] ducha tibia en lugar de ryan sesión con la almohadilla térmica. · Pruebe a usar ryan compresa de hielo por entre 10 y 13 minutos cada 2 o 3 horas. Póngase un paño lao entre el hielo y la piel.   · Si mcguire médico le recomienda un collarín cervical, úselo exactamente jayne se lo haya indicado. ¿Cuándo debe pedir ayuda? Llame a mcguire médico ahora mismo o busque atención médica inmediata si:  ? · Tiene un entumecimiento nuevo o [employer], las nalgas o las piernas. ? · Usted tiene ryan debilidad nueva o peor en los brazos o las piernas. ? · Mcguire dolor de gopi empeora. ? · Pierde el control de la vejiga o de los intestinos. ?Preste especial atención a los cambios en mcguire thalia y asegúrese de comunicarse con mcguire médico si:  ? · No mejora jayne se esperaba. ¿Dónde puede encontrar más información en inglés? Elder Slimmer a http://sybil-dionicio.info/. Dania Records B691 en la búsqueda para aprender más acerca de \"Tortícolis: Instrucciones de cuidado - [ Torticollis: Care Instructions ]. \"  Revisado: 21 marzo, 2017  Versión del contenido: 11.4  © 8601-8144 Healthwise, Incorporated. Las instrucciones de cuidado fueron adaptadas bajo licencia por [employer] (which disclaims liability or warranty for this information). Si usted tiene [place] afección médica o sobre estas instrucciones, siempre pregunte a mcguire profesional de thalia. Healthwise, Incorporated niega toda garantía o responsabilidad por mcguire uso de esta información. We hope that we have addressed all of your medical concerns. The examination and treatment you received in the Emergency Department were for an emergent problem and were not intended as complete care. It is important that you follow up with your healthcare provider(s) for ongoing care. If your symptoms worsen or do not improve as expected, and you are unable to reach your usual health care provider(s), you should return to the Emergency Department. Today's healthcare is undergoing tremendous change, and patient satisfaction surveys are one of the many tools to assess the quality of medical care.   You may receive a survey from the [employer] organization regarding your experience in the Emergency Department. I hope that your experience has been completely positive, particularly the medical care that I provided. As such, please participate in the survey; anything less than excellent does not meet my expectations or intentions. [facility] participate in nationally recognized quality of care measures. If your blood pressure is greater than 120/80, as reported below, we urge that you seek medical care to address the potential of high blood pressure, commonly known as hypertension. Hypertension can be hereditary or can be caused by certain medical conditions, pain, stress, or \"white coat syndrome. \"       Please make an appointment with your health care provider(s) for follow up of your Emergency Department visit. VITALS:   Patient Vitals for the past 8 hrs:   Temp Pulse Resp BP SpO2   06/24/18 1307 98.5 °F (36.9 °C) 98 12 110/72 97 %          Thank you for allowing us to provide you with medical care today. We realize that you have many choices for your emergency care needs. Please choose us in the future for any continued health care needs. ARIELLE Gonzales 71: [phone]            No results found for this or any previous visit (from the past 24 hour(s)). Xr Shoulder Lt Ap/lat Min 2 V    Result Date: 6/24/2018  EXAM:  XR SHOULDER LT AP/LAT MIN 2 V INDICATION: Left shoulder pain for 6 months, increased in severity. COMPARISON: None. FINDINGS: Three views of the left shoulder demonstrate no fracture, dislocation or other acute abnormality. IMPRESSION:  No acute abnormality.

## 2020-03-03 ENCOUNTER — APPOINTMENT (OUTPATIENT)
Dept: GENERAL RADIOLOGY | Age: 50
End: 2020-03-03
Attending: EMERGENCY MEDICINE
Payer: SELF-PAY

## 2020-03-03 ENCOUNTER — HOSPITAL ENCOUNTER (EMERGENCY)
Age: 50
Discharge: HOME | End: 2020-03-03
Attending: EMERGENCY MEDICINE
Payer: SELF-PAY

## 2020-03-03 VITALS
DIASTOLIC BLOOD PRESSURE: 102 MMHG | TEMPERATURE: 98.9 F | OXYGEN SATURATION: 96 % | SYSTOLIC BLOOD PRESSURE: 160 MMHG | HEART RATE: 109 BPM | HEIGHT: 65 IN | WEIGHT: 156 LBS | RESPIRATION RATE: 16 BRPM | BODY MASS INDEX: 25.99 KG/M2

## 2020-03-03 DIAGNOSIS — M54.12 CERVICAL RADICULOPATHY: Primary | ICD-10-CM

## 2020-03-03 DIAGNOSIS — G89.29 CHRONIC LEFT SHOULDER PAIN: ICD-10-CM

## 2020-03-03 DIAGNOSIS — M25.512 CHRONIC LEFT SHOULDER PAIN: ICD-10-CM

## 2020-03-03 LAB
ALBUMIN SERPL-MCNC: 4.6 G/DL (ref 3.5–5)
ALBUMIN/GLOB SERPL: 1.1 {RATIO} (ref 1.1–2.2)
ALP SERPL-CCNC: 86 U/L (ref 45–117)
ALT SERPL-CCNC: 197 U/L (ref 12–78)
ANION GAP SERPL CALC-SCNC: 7 MMOL/L (ref 5–15)
APPEARANCE UR: CLEAR
AST SERPL-CCNC: 96 U/L (ref 15–37)
ATRIAL RATE: 105 BPM
BACTERIA URNS QL MICRO: NEGATIVE /HPF
BASOPHILS # BLD: 0.1 K/UL (ref 0–0.1)
BASOPHILS NFR BLD: 1 % (ref 0–1)
BILIRUB SERPL-MCNC: 0.6 MG/DL (ref 0.2–1)
BILIRUB UR QL: NEGATIVE
BUN SERPL-MCNC: 8 MG/DL (ref 6–20)
BUN/CREAT SERPL: 8 (ref 12–20)
CALCIUM SERPL-MCNC: 8.8 MG/DL (ref 8.5–10.1)
CALCULATED P AXIS, ECG09: 34 DEGREES
CALCULATED R AXIS, ECG10: 29 DEGREES
CALCULATED T AXIS, ECG11: 1 DEGREES
CHLORIDE SERPL-SCNC: 97 MMOL/L (ref 97–108)
CO2 SERPL-SCNC: 27 MMOL/L (ref 21–32)
COLOR UR: NORMAL
COMMENT, HOLDF: NORMAL
CREAT SERPL-MCNC: 1.01 MG/DL (ref 0.7–1.3)
D DIMER PPP FEU-MCNC: 0.2 MG/L FEU (ref 0–0.65)
DIAGNOSIS, 93000: NORMAL
DIFFERENTIAL METHOD BLD: ABNORMAL
EOSINOPHIL # BLD: 0.1 K/UL (ref 0–0.4)
EOSINOPHIL NFR BLD: 1 % (ref 0–7)
EPITH CASTS URNS QL MICRO: NORMAL /LPF
ERYTHROCYTE [DISTWIDTH] IN BLOOD BY AUTOMATED COUNT: 13.8 % (ref 11.5–14.5)
GLOBULIN SER CALC-MCNC: 4.1 G/DL (ref 2–4)
GLUCOSE SERPL-MCNC: 118 MG/DL (ref 65–100)
GLUCOSE UR STRIP.AUTO-MCNC: NEGATIVE MG/DL
HCT VFR BLD AUTO: 50.6 % (ref 36.6–50.3)
HGB BLD-MCNC: 17.3 G/DL (ref 12.1–17)
HGB UR QL STRIP: NEGATIVE
HYALINE CASTS URNS QL MICRO: NORMAL /LPF (ref 0–5)
IMM GRANULOCYTES # BLD AUTO: 0.1 K/UL (ref 0–0.04)
IMM GRANULOCYTES NFR BLD AUTO: 1 % (ref 0–0.5)
KETONES UR QL STRIP.AUTO: NEGATIVE MG/DL
LEUKOCYTE ESTERASE UR QL STRIP.AUTO: NEGATIVE
LIPASE SERPL-CCNC: 112 U/L (ref 73–393)
LYMPHOCYTES # BLD: 1.9 K/UL (ref 0.8–3.5)
LYMPHOCYTES NFR BLD: 21 % (ref 12–49)
MCH RBC QN AUTO: 31.2 PG (ref 26–34)
MCHC RBC AUTO-ENTMCNC: 34.2 G/DL (ref 30–36.5)
MCV RBC AUTO: 91.3 FL (ref 80–99)
MONOCYTES # BLD: 0.8 K/UL (ref 0–1)
MONOCYTES NFR BLD: 9 % (ref 5–13)
NEUTS SEG # BLD: 6 K/UL (ref 1.8–8)
NEUTS SEG NFR BLD: 67 % (ref 32–75)
NITRITE UR QL STRIP.AUTO: NEGATIVE
NRBC # BLD: 0 K/UL (ref 0–0.01)
NRBC BLD-RTO: 0 PER 100 WBC
P-R INTERVAL, ECG05: 122 MS
PH UR STRIP: 7 [PH] (ref 5–8)
PLATELET # BLD AUTO: 268 K/UL (ref 150–400)
PMV BLD AUTO: 10 FL (ref 8.9–12.9)
POTASSIUM SERPL-SCNC: 3.7 MMOL/L (ref 3.5–5.1)
PROT SERPL-MCNC: 8.7 G/DL (ref 6.4–8.2)
PROT UR STRIP-MCNC: NEGATIVE MG/DL
Q-T INTERVAL, ECG07: 330 MS
QRS DURATION, ECG06: 80 MS
QTC CALCULATION (BEZET), ECG08: 436 MS
RBC # BLD AUTO: 5.54 M/UL (ref 4.1–5.7)
RBC #/AREA URNS HPF: NORMAL /HPF (ref 0–5)
SAMPLES BEING HELD,HOLD: NORMAL
SODIUM SERPL-SCNC: 131 MMOL/L (ref 136–145)
SP GR UR REFRACTOMETRY: <1.005 (ref 1–1.03)
TROPONIN I SERPL-MCNC: <0.05 NG/ML
UROBILINOGEN UR QL STRIP.AUTO: 1 EU/DL (ref 0.2–1)
VENTRICULAR RATE, ECG03: 105 BPM
WBC # BLD AUTO: 8.8 K/UL (ref 4.1–11.1)
WBC URNS QL MICRO: NORMAL /HPF (ref 0–4)

## 2020-03-03 PROCEDURE — 99283 EMERGENCY DEPT VISIT LOW MDM: CPT

## 2020-03-03 PROCEDURE — 93005 ELECTROCARDIOGRAM TRACING: CPT

## 2020-03-03 PROCEDURE — 83690 ASSAY OF LIPASE: CPT

## 2020-03-03 PROCEDURE — 85025 COMPLETE CBC W/AUTO DIFF WBC: CPT

## 2020-03-03 PROCEDURE — 71046 X-RAY EXAM CHEST 2 VIEWS: CPT

## 2020-03-03 PROCEDURE — 36415 COLL VENOUS BLD VENIPUNCTURE: CPT

## 2020-03-03 PROCEDURE — 73030 X-RAY EXAM OF SHOULDER: CPT

## 2020-03-03 PROCEDURE — 72050 X-RAY EXAM NECK SPINE 4/5VWS: CPT

## 2020-03-03 PROCEDURE — 84484 ASSAY OF TROPONIN QUANT: CPT

## 2020-03-03 PROCEDURE — 80053 COMPREHEN METABOLIC PANEL: CPT

## 2020-03-03 PROCEDURE — 81001 URINALYSIS AUTO W/SCOPE: CPT

## 2020-03-03 PROCEDURE — 85379 FIBRIN DEGRADATION QUANT: CPT

## 2020-03-03 RX ORDER — METHYLPREDNISOLONE 4 MG/1
TABLET ORAL
Qty: 1 DOSE PACK | Refills: 0 | Status: SHIPPED | OUTPATIENT
Start: 2020-03-03

## 2020-03-03 NOTE — ED PROVIDER NOTES
Pt c/o left sided neck swelling for 1 week. Pt also c/o chest pain, described as a warm sensation. Pt notes accompanying arm pain, mild SOB, and dizziness. Pt states his chest pain was worse this morning. Pt states he has experienced similar symptoms in the past.  Pt is tachycardic and hypertensive in triage. Pt denies sore throat or N/V. I have evaluated the patient as the Provider in Triage. I have reviewed His vital signs and the triage nurse assessment. I have talked with the patient and any available family and advised that I am the provider in triage and have ordered the appropriate study to initiate their work up based on the clinical presentation during my assessment. I have advised that the patient will be accommodated in the Main ED as soon as possible. I have also requested to contact the triage nurse or myself immediately if the patient experiences any changes in their condition during this brief waiting period. Note written by Destiney Devries, as dictated by Sam Lawler DNP 12:44 PM        The history is provided by the patient. A  was used. 1:48 PM  Patient presents for neck pain x 1 week states he has chest pain with it and SOB. States has not been working since December but was previously working as a . States when his neck swells and hurts he has numbness going down his L arm and L leg. Per chart review has been seen multiple times for similar complaints since 2016 in ER.     [facility] [id number]  Past Medical History:   Diagnosis Date    Carpal tunnel syndrome        Past Surgical History:   Procedure Laterality Date    HX CARPAL TUNNEL RELEASE  2016    HX CYST REMOVAL Left 2016         Family History:   Problem Relation Age of Onset    Diabetes Neg Hx     Hypertension Neg Hx        Social History     Socioeconomic History    Marital status: SINGLE     Spouse name: Not on file    Number of children: Not on file    Years of education: Not on file    Highest education level: Not on file   Occupational History    Not on file   Social Needs    Financial resource strain: Not on file    Food insecurity:     Worry: Not on file     Inability: Not on file    Transportation needs:     Medical: Not on file     Non-medical: Not on file   Tobacco Use    Smoking status: Former Smoker     Types: Cigarettes    Smokeless tobacco: Former User    Tobacco comment: 2011 quit   Substance and Sexual Activity    Alcohol use: No     Alcohol/week: 0.0 standard drinks     Comment: occ.  Drug use: No    Sexual activity: Yes     Partners: Female   Lifestyle    Physical activity:     Days per week: Not on file     Minutes per session: Not on file    Stress: Not on file   Relationships    Social connections:     Talks on phone: Not on file     Gets together: Not on file     Attends Adventism service: Not on file     Active member of club or organization: Not on file     Attends meetings of clubs or organizations: Not on file     Relationship status: Not on file    Intimate partner violence:     Fear of current or ex partner: Not on file     Emotionally abused: Not on file     Physically abused: Not on file     Forced sexual activity: Not on file   Other Topics Concern    Not on file   Social History Narrative    ** Merged History Encounter **          ALLERGIES: Patient has no known allergies. Review of Systems    There were no vitals filed for this visit. Physical Exam  Vitals signs and nursing note reviewed. Constitutional:       General: He is not in acute distress. Appearance: He is well-developed. He is not ill-appearing or toxic-appearing. HENT:      Head: Normocephalic and atraumatic. Neck:      Musculoskeletal: Normal range of motion and neck supple. Comments: No gross edema or swelling appreciated  Cardiovascular:      Rate and Rhythm: Regular rhythm. Tachycardia present.       Pulses:           Radial pulses are 2+ on the right side and 2+ on the left side. Heart sounds: Normal heart sounds. No murmur. Pulmonary:      Effort: Pulmonary effort is normal. No tachypnea or respiratory distress. Breath sounds: Normal breath sounds. No decreased breath sounds, wheezing or rhonchi. Abdominal:      Palpations: Abdomen is soft. There is no hepatomegaly or mass. Tenderness: There is no guarding. Musculoskeletal:      Right lower leg: He exhibits no tenderness. No edema. Left lower leg: He exhibits no tenderness. No edema. Comments: Pain with flexion to 180* of L arm. Neg spurlings BL. Tenderness to palpation of L trap. No atrophy, echymosis on gross inspection. Full ROM of R arm and leg. Lymphadenopathy:      Cervical: No cervical adenopathy. Neurological:      General: No focal deficit present. Mental Status: He is alert and oriented to person, place, and time. Cranial Nerves: No cranial nerve deficit. Comments: Strength 5/5 in upper and lower extremities BL. EOM intact. Gross sensation intact BL. Psychiatric:         Mood and Affect: Mood normal.         Behavior: Behavior normal.        MDM  Number of Diagnoses or Management Options  Cervical radiculopathy: established and improving  Chronic left shoulder pain: established and improving     Amount and/or Complexity of Data Reviewed  Clinical lab tests: ordered and reviewed  Tests in the radiology section of CPT®: ordered and reviewed  Review and summarize past medical records: yes  Independent visualization of images, tracings, or specimens: yes    Risk of Complications, Morbidity, and/or Mortality  Presenting problems: moderate  Diagnostic procedures: moderate  Management options: moderate    Patient Progress  Patient progress: stable         Procedures    EKG - Tachy, 105, sinus, , QRS 80, QTc 436, no gross ST changes or ischemia     Cervical radiculopathy likely given mild arthritic changes on cervical spine XR.  CXR and L shoulder XR neg. Give medrol dose pack. Recommend follow up with PCP and Sports Med. RTC precautions given. Discussed abnormal liver enzymes and mild hemachromatosis - needs to follow up for further assessment with PCP.      Patient discussed with Dr. Kevin Haque (Attending)    Janey Magana, DO

## 2020-03-03 NOTE — ED TRIAGE NOTES
Neck swollen on the left side for a week. No sore throat. \"Whenever my neck swells, I get chest pain and arm pain. \" A little SOB and dizziness.

## 2020-03-03 NOTE — DISCHARGE INSTRUCTIONS
Patient Education        Dolor de espalda: Instrucciones de cuidado - [ Back Pain: Care Instructions ]  Instrucciones de cuidado    El dolor de espalda tiene muchas causas posibles. Con frecuencia, está relacionado con problemas en los músculos y ligamentos de la espalda. También podría estar relacionado con problemas de los nervios, discos o huesos de la espalda. Moverse, levantar algo, ponerse de pie, sentarse o dormir de [employer] incómoda pueden forzar la espalda. Algunas veces no se da cuenta de que tiene ryan lesión [employer] tarde. La artritis es otra causa común del dolor de espalda. Aunque harlan vez duela mucho, el dolor de espalda por lo general mejora por sí mismo en varias semanas. [address] personas se recuperan en 12 semanas o menos. Hacer un buen tratamiento en el hogar y tener cuidado de no forzar la espalda puede ayudar a que se sienta mejor más pronto. La atención de seguimiento es ryan parte clave de mcguire tratamiento y seguridad. Asegúrese de hacer y acudir a todas las citas, y llame a mcguire médico si está teniendo problemas. También es ryan buena idea saber los resultados de sarthak exámenes y mantener ryan lista de los medicamentos que minerva. ¿Cómo puede cuidarse en el hogar? · Siéntese o acuéstese en las posiciones más cómodas y que reduzcan el dolor. Trate ryan de estas posiciones al [place]:  ? Acuéstese boca arriba con las rodillas dobladas y apoyadas sobre [address]. ? Acuéstese en el piso con las piernas sobre el asiento de un sofá o ryan silla. ? Acuéstese de lado con las rodillas y caderas dobladas y [place] almohada entre las piernas. ? Acuéstese boca abajo siempre que esta posición no empeore el dolor. · No se siente en la cama y evite los sofás blandos y las posiciones torcidas. El reposo en cama puede aliviar el dolor al principio, kris retrasa la sanación. Evite reposar en la cama después del primer día de dolor de espalda. · Cambie de posición cada 30 minutos.  Si debe sentarse por [employer], párese y descanse de estar sentado. Levántese y camine, o acuéstese en ryan posición cómoda. · Pruebe usar ryan almohadilla térmica a temperatura baja o mediana troy 15 a 20 minutos cada 2 ó 3 horas. Pruebe ryan ducha tibia en vez de ryan sesión con la almohadilla térmica. · También puede probar ryan compresa de hielo troy 10 a 15 minutos cada 2 a 3 horas. Póngase un paño lao entre la compresa de hielo y la piel. · [employer] analgésicos (medicamentos para el dolor) exactamente según las indicaciones. ? Si el médico le recetó un analgésico, tómelo según las indicaciones. ? Si no está tomando un analgésico recetado, pregúntele a mcguire médico si puede gosia medardo de [employer]. · Selma caminatas cortas varias veces al día. Usted puede comenzar con 5 a 10 minutos, 3 ó 4 veces al día, y aumentar progresivamente hasta lograr caminatas más largas. Camine en superficies pete y evite colinas y escaleras hasta que la espalda esté mejor. · Regrese al [employer] y otras actividades lo más pronto posible. El descanso continuo sin actividad por lo general no es rivera para la espalda. · Para prevenir el dolor de espalda en el futuro, selma ejercicios para estirar y fortalecer la espalda y el abdomen. Aprenda a [employer] [place], técnicas seguras para levantar peso y la mecánica corporal apropiada. ¿Cuándo debe pedir ayuda? Llame a mcguire médico ahora mismo o busque atención médica inmediata si:    · Tiene un entumecimiento nuevo o peor en las piernas.     · Tiene debilidad nueva o peor en las piernas. (Noble puede hacer que le resulte difícil ponerse de pie).   · Pierde el control de la vejiga o los intestinos.    Preste especial atención a los cambios en mcguire thalia y asegúrese de comunicarse con mcguire médico si:    · Tiene fiebre, pierde peso o no se siente jason.     · No mejora jayne se esperaba. ¿Dónde puede encontrar más información en inglés?   Lokan Gan a http://sybil-dionicio.info/. Brandy S213 en la búsqueda para aprender Carlos Greener de \"Dolor de espalda: Instrucciones de cuidado - [ Back Pain: Care Instructions ]. \"  Revisado: 26 junio, 2019  Versión del contenido: 12.2  © 3596-7095 Healthwise, Incorporated. Las instrucciones de cuidado fueron adaptadas bajo licencia por [employer] (which disclaims liability or warranty for this information). Si usted tiene [place] afección médica o sobre estas instrucciones, siempre pregunte a mcguire profesional de thalia. Healthwise, Incorporated niega toda garantía o responsabilidad por mcguire uso de esta información.       Pinzamiento de un nervio en el gopi: Instrucciones de cuidado - [ Evertt Elissa Nerve in the Neck: Care Instructions ]  Instrucciones de cuidado  Un pinzamiento de un nervio en el gopi ocurre cuando se daña Loura Pester o un disco en la parte superior de la columna vertebral. Omon daño puede ser consecuencia de ryan Faythe Silence. O simplemente puede suceder con la edad. Los cambios causados por el daño pueden ejercer presión en [facility] raíz nerviosa cercana y oprimirla. [place] provoca síntomas jayne dolor [employer] gopi, el hombro, el brazo, la mano o la espalda. También podría causar hormigueo o entumecimiento. A veces causa debilidad en el brazo. Los síntomas suelen ser peores cuando se gira la dave o se tensiona el gopi. Para The [facility], los síntomas mejoran con el tiempo y finalmente desaparecen. El tratamiento temprano suele incluir medicamentos para el dolor y la hinchazón. A veces, la fisioterapia y ejercicios especiales pueden ayudar. La atención de seguimiento es ryan parte clave de mcguire tratamiento y seguridad. Asegúrese de hacer y acudir a todas las citas, y llame a mcguire médico si está teniendo problemas. También es ryan buena idea saber los resultados de sarthak exámenes y mantener ryan lista de los medicamentos que minerva. ¿Cómo puede cuidarse en el hogar?   · Sea kevin con los medicamentos. Yenifer y siga todas las instrucciones de la [place]. ? Si el médico le recetó un analgésico (medicamento para el dolor), tómelo jayne se lo indicó. ? Si no está tomando un analgésico recetado, pregúntele a mcguire médico si puede gosia un medicamento de [facility] American. · Trate de utilizar ryan almohadilla térmica a baja o media temperatura por entre 15 y 20 minutos cada 2 o 3 horas. Pruebe con Nile Meckel ducha tibia en vez de ryan sesión con la almohadilla térmica. También puede comprar envolturas calientes (\"heat wraps\") desechables que dorado hasta 8 horas. · También puede probar con ryan compresa de hielo por entre 10 y 15 minutos cada 2 o 3 horas. No hay ryan evidencia sólida de que el calor o el hielo ayuden. [place] usted puede probarlos para javid si le ayudan. · No pase demasiado tiempo en ryan mariana posición. Barnard descansos breves para pasear y cambiar la postura. · Use un cinturón de seguridad y un arnés de hombro cuando está en un auto. · Duerma con ryan almohada bajo la dave y el gopi que le mantenga el gopi derecho. · Si le dieron un gopi ortopédico (collarín cervical) para restringir la movilidad del gopi, úselo jayne se lo indicaron por todos los días que le diga el médico. No lo use troy más tiempo de lo que le indicaron. Usar un gopi ortopédico por demasiado tiempo puede causar rigidez en el gopi y puede debilitar los músculos del gopi. · Siga las indicaciones de mcguire médico para hacer ejercicios suaves de estiramiento del gopi. · No fume. Fumar puede retrasar la sanación de [facility]. Si necesita ayuda para dejar el hábito, hable con mcguire médico sobre programas y medicamentos para dejar de fumar. Estos pueden aumentar sarthak probabilidades de dejar de fumar para siempre. · Evite hacer trabajos y ejercicios intensos hasta que mcguire médico lo apruebe. ¿Cuándo debe pedir ayuda? Llame al 911 en cualquier momento que considere que necesita atención de [place].  Por ejemplo, llame si:    · Es completamente incapaz de  un brazo o ryan pierna.    Llame a mcguire médico ahora mismo o busque atención médica inmediata si:    · Usted tiene síntomas nuevos o peores en los brazos, las piernas, el pecho, el abdomen o las nalgas (glúteos). Los síntomas pueden incluir:  ? Entumecimiento u hormigueo. ? Debilidad. ? Dolor.     · Pierde el control de la vejiga o del intestino.    Preste especial atención a los cambios en mcguire thalia y asegúrese de comunicarse con mcguire médico si:    · No mejora jayne se esperaba. ¿Dónde puede encontrar más información en inglés? Vee Ledesma a [email or web address]-[email or web address].info/. Brandy N481 en la búsqueda para aprender más acerca de \"Pinzamiento de un nervio en el gopi: Instrucciones de cuidado - [ Pinched Nerve in the Neck: Care Instructions ]. \"  Revisado: 26 junio, 2019  Versión del contenido: 12.2  © 0550-4512 [employer]. Las instrucciones de cuidado fueron adaptadas bajo licencia por Good [employer] Connections (which disclaims liability or warranty for this information). Si usted tiene [place] afección médica o sobre estas instrucciones, siempre pregunte a mcguire profesional de thalia.  [employer], [employer] niega toda garantía o responsabilidad por mcguire uso de esta información.

## 2021-03-12 ENCOUNTER — HOSPITAL ENCOUNTER (EMERGENCY)
Age: 51
Discharge: STILL ADMITTED OR ADMINISTRATIVE | End: 2021-03-12
Attending: EMERGENCY MEDICINE

## 2023-05-12 RX ORDER — METHYLPREDNISOLONE 4 MG/1
TABLET ORAL
COMMUNITY
Start: 2020-03-03

## 2023-05-12 RX ORDER — DICYCLOMINE HYDROCHLORIDE 10 MG/1
20 CAPSULE ORAL 3 TIMES DAILY
COMMUNITY
Start: 2018-01-02